# Patient Record
(demographics unavailable — no encounter records)

---

## 2025-04-14 NOTE — HISTORY OF PRESENT ILLNESS
[de-identified] : PHILLIP CABRERA is a 75 year old male who presents for his f/u evaluation of right worse than left knee pain. Denies recent injury. He is able to walk miles and uses a knee sleeve for playing tennis or golf. States that most pain on walking downstairs and is worse than going up. Takes Tylenol prn. His last bilateral knee Gel injection was last year in April with great improvement. He also received bilateral knee Cortisone injection prior it on February 5th last year. He is looking for repeating same treatment and get the Cortisone injection today.

## 2025-04-14 NOTE — PROCEDURE
[de-identified] : BILATERAL KNEE CORTISONE INJECTION Discussed at length with the patient the planned steroid and lidocaine injection. The risks, benefits, convalescence and alternatives were reviewed. The possible side effects discussed included but were not limited to: pain, swelling, heat and redness. These symptoms are generally mild but if they are extensive then contact the office. Giving pain relievers by mouth such as NSAIDs or Tylenol can generally treat the reactions to steroid and lidocaine. Rare cases of infection have been noted. Rash, hives and itching may occur post injection. If you have muscle pain or cramps, flushing and or swelling of the face, rapid heart beat, nausea, dizziness, fever, chills, headache, difficulty breathing, swelling in the arms or legs, or have a prickly feeling of your skin, contact a health care provider immediately.  Following this discussion, the knee was prepped with betadine and under sterile conditions 5 cc of 2% lidocaine and 1 cc depo-medrol (40mg) were injected with a 21 gauge needle. The needle was introduced into the joint, aspiration was performed to ensure intra-articular placement and the medication was injected. Upon withdrawal of the needle the site was cleaned with alcohol and a bandaid applied. The patient tolerated the injection well and there were no adverse effects. Post injection instructions included no strenuous activity for 24 hours, cryotherapy and if there are any adverse effects to contact the office.

## 2025-04-14 NOTE — PHYSICAL EXAM
[de-identified] : General appearance: well nourished and hydrated, pleasant, alert and oriented x 3, cooperative. HEENT: Normocephalic, EOM intact, Nasal septum midline, Oral cavity clear, External auditory canal clear. Cardiovascular: no apparent abnormalities, no lower leg edema, no varicosities, pedal pulses are palpable. Lymphatics Lymph nodes: none palpated, Lymphedema: not present. Neurologic: sensation is normal, no muscle weakness in upper or lower extremities, patella tendon reflexes intact . Dermatologic no apparent skin lesions, moist, warm, no rash. Spine:cervical spine appears normal and moves freely, thoracic spine appears normal and moves freely, lumbosacral spine appears normal and moves freely. Gait: nonantalgic.  Left knee Inspection: trace effusion, no erythema. Wounds: none. Alignment: normal. Palpation: no specific tenderness on palpation. ROM active (in degrees): 0-130 with crepitus Ligamentous laxity: all ligaments appear stable,, negative ant. drawer test, negative post. drawer test, stable to varus stress test, stable to valgus stress test. negative Lachman's test, negative pivot shift test Meniscal Test: negative McMurrays, negative Jordyn. Patellofemoral Alignment Test: Q angle-, normal. Muscle Test: good quad strength. Leg examination: calf is soft and non-tender.  Right knee Inspection: trace effusion, no erythema. Wounds: none. Alignment: normal. Palpation: no specific tenderness on palpation. ROM active (in degrees): 0-130 with crepitus Ligamentous laxity: all ligaments appear stable,, negative ant. drawer test, negative post. drawer test, stable to varus stress test, stable to valgus stress test. negative Lachman's test, negative pivot shift test Meniscal Test: negative McMurrays, negative Jordyn. Patellofemoral Alignment Test: Q angle-, normal. Muscle Test: good quad strength. Leg examination: calf is soft and non-tende [de-identified] :  RIGHT knee xrays, standing AP/Lateral and Merchant films, and 45 degree PA standing view, taken at the office today shows diffuse tricompartmental degenerative arthritis, normal alignment, lateral joint space narrowing, marginal osteophytes, medial bone on bone with sclerosis, especially on the Field view, patella at appropriate height and central position, patellofemoral joint space narrowing, peripheral osteophytes, Kellgren and Richardson grade 3-4.  LEFT knee xrays, standing AP/Lateral and Merchant films, and 45 degree PA standing view, taken at the office today shows diffuse tricompartmental degenerative arthritis, normal alignment, medial joint space narrowing, marginal osteophytes, medial bone on bone with sclerosis, patella at appropriate height with lateral tracking, patellofemoral joint space narrowing, peripheral osteophytes, Kellgren and Richardson grade 2-3 with patellofemoral arthritis.

## 2025-04-14 NOTE — DISCUSSION/SUMMARY
[de-identified] : Discussed at length the nature of the patients condition. Their bilateral knee symptoms, right is more symptomatic than the left, appear secondary to degenerative arthritis. We reviewed operative and nonoperative treatment. While I believe they would eventually benefit from a staged bilateral TKR with the right done first, they are hesitant to have surgery at this time. Therefore, we will continue nonoperatively. We will seek authorization for bilateral knee viscosupplementation injections. Once we receive authorization, we will proceed accordingly. In the interim, patient was given a bilateral knee cortisone injection today as detailed above for symptomatic relief. I also suggested PT, a home exercise program, weight loss, and Tylenol prn since they are currently on Plavix.  Patient can continue activities as tolerated. All questions answered, understanding verbalized. Patient in agreement with plan of care.

## 2025-05-06 NOTE — HISTORY OF PRESENT ILLNESS
[FreeTextEntry1] : 74 yo male referred for urologic evaluation.  Her notes mild LUTS with nocturia x 1 and occasional frequency.  His stream is generally strong.  He denies hematuria, dysuria, hesitancy, or incontinence.  His PSAs are as follows:  1/2025 = 3.57; 34% free 1/2024 = 2.37; 41% free  He denies any family hx of  malignancy.   PVR = 46 cc (LUTS)

## 2025-05-06 NOTE — PHYSICAL EXAM
[Normal Appearance] : normal appearance [General Appearance - In No Acute Distress] : no acute distress [Edema] : no peripheral edema [Arterial Pulses Normal] : the pedal pulses were normal [Abdomen Soft] : soft [Abdomen Tenderness] : non-tender [Abdomen Hernia] : no hernia was discovered [Urethral Meatus] : meatus normal [Epididymis] : the epididymides were normal [Testes Tenderness] : no tenderness of the testes [Testes Mass (___cm)] : there were no testicular masses [Prostate Tenderness] : the prostate was not tender [No Prostate Nodules] : no prostate nodules [Prostate Size ___ (0-4)] : prostate size [unfilled] (scale: 0-4) [Normal Station and Gait] : the gait and station were normal for the patient's age [] : no rash [No Focal Deficits] : no focal deficits [Oriented To Time, Place, And Person] : oriented to person, place, and time [Not Anxious] : not anxious

## 2025-05-06 NOTE — ASSESSMENT
[FreeTextEntry1] : 74 yo male with mild, minimally bothersome LUTS.   Lower urinary symptoms were reviewed including the potential etiologies of the patient's symptoms. The anatomy of the urinary tract was reviewed. Bladder, prostate, and urethral sources, as well as non-urologic sources, were discussed. Options for evaluation were discussed including cystoscopy, urodynamics, and pelvis ultrasonography. Management of symptoms were reviewed including no therapy, medical therapy, and surgical therapy.  Medical therapy for BPH (benign prostatic hyperplasia), or prostate enlargement, was reviewed including the physiology of medication therapy. Alpha blocker medication therapy was reviewed including adverse effects (including dizziness, hypotension, retrograde ejaculation, rhinitis, fatigue), proper usage, and contraindications.   He is not bothered by his urination at this time and will hold off on any treatment.   We will get a PSA and he will continue with annual screening.   Plan: 1. PSA today 2. RTC 1 year if PSA normal

## 2025-05-12 NOTE — PROCEDURE
[de-identified] : Euflexxa # 1 Bilateral knee Discussed at length with the patient the planned Euflexxa injection. The risks, benefits, convalescence and alternatives were reviewed. The possible side effects discussed included but were not limited to: pain, swelling, heat and redness. There symptoms are generally mild but if they are extensive then contact the office. Giving pain relievers by mouth such as NSAIDs or Tylenol can generally treat the reactions to Euflexxa. Rare cases of infection have been noted. Rash, hives and itching may occur post injection. If you have muscle pain or cramps, flushing and or swelling of the face, rapid heart beat, nausea, dizziness, fever, chills, headache, difficulty breathing, swelling in the arms or legs, or have a prickly feeling of your skin, contact a health care provider immediately.  Following this discussion, the knee was prepped with betadine and under sterile condition the Euflexxa injection was performed with a 22 gauge needle. The needle was introduced into the joint, aspiration was performed to ensure intra-articular placement and the medication was injected. Upon withdrawal of the needle the site was cleaned with alcohol and a bandaid applied. The patient tolerated the injection well and there were no adverse effects. Post injection instructions included no strenuous activity for 24 hours, cryotherapy and if there are any adverse effects to contact the office.

## 2025-05-19 NOTE — PROCEDURE
[de-identified] : Euflexxa # 2 Bilateral knee Discussed at length with the patient the planned Euflexxa injection. The risks, benefits, convalescence and alternatives were reviewed. The possible side effects discussed included but were not limited to: pain, swelling, heat and redness. There symptoms are generally mild but if they are extensive then contact the office. Giving pain relievers by mouth such as NSAIDs or Tylenol can generally treat the reactions to Euflexxa. Rare cases of infection have been noted. Rash, hives and itching may occur post injection. If you have muscle pain or cramps, flushing and or swelling of the face, rapid heart beat, nausea, dizziness, fever, chills, headache, difficulty breathing, swelling in the arms or legs, or have a prickly feeling of your skin, contact a health care provider immediately.  Following this discussion, the knee was prepped with betadine and under sterile condition the Euflexxa injection was performed with a 22 gauge needle. The needle was introduced into the joint, aspiration was performed to ensure intra-articular placement and the medication was injected. Upon withdrawal of the needle the site was cleaned with alcohol and a bandaid applied. The patient tolerated the injection well and there were no adverse effects. Post injection instructions included no strenuous activity for 24 hours, cryotherapy and if there are any adverse effects to contact the office.

## 2025-05-19 NOTE — PROCEDURE
[de-identified] : Euflexxa # 2 Bilateral knee Discussed at length with the patient the planned Euflexxa injection. The risks, benefits, convalescence and alternatives were reviewed. The possible side effects discussed included but were not limited to: pain, swelling, heat and redness. There symptoms are generally mild but if they are extensive then contact the office. Giving pain relievers by mouth such as NSAIDs or Tylenol can generally treat the reactions to Euflexxa. Rare cases of infection have been noted. Rash, hives and itching may occur post injection. If you have muscle pain or cramps, flushing and or swelling of the face, rapid heart beat, nausea, dizziness, fever, chills, headache, difficulty breathing, swelling in the arms or legs, or have a prickly feeling of your skin, contact a health care provider immediately.  Following this discussion, the knee was prepped with betadine and under sterile condition the Euflexxa injection was performed with a 22 gauge needle. The needle was introduced into the joint, aspiration was performed to ensure intra-articular placement and the medication was injected. Upon withdrawal of the needle the site was cleaned with alcohol and a bandaid applied. The patient tolerated the injection well and there were no adverse effects. Post injection instructions included no strenuous activity for 24 hours, cryotherapy and if there are any adverse effects to contact the office.